# Patient Record
(demographics unavailable — no encounter records)

---

## 2025-06-03 NOTE — HISTORY OF PRESENT ILLNESS
[Obstructive Sleep Apnea] : obstructive sleep apnea [FreeTextEntry1] : Mr. Braxton 45 yo man who presents for the evaluation of HAILEY PMHx, DM, HTN, ex 5 pack year smoker quit 4 years ago Referred by Dr. Blue (PCP)  04/27/2023 : JAMES BRAXTON is a 41 year old male who is being evaluated for possible sleep disordered breathing. By his primary physician for recent findings of polycythemia. He is a former smoker who quit about 2 years ago, had smoked less than half a pack per day. No history of lung disease, asthma, dyspnea, wheezing, cough. He is told of some snoring, particularly after any alcohol ingestion, has not been told of witnessed apnea. To bed 11 PM, falls asleep within 10 to 20 minutes, awakens once briefly on a typical night before getting up at 8 AM. He generally feels refreshed in the morning. He does not nap. He does not have significant daytime sleepiness, [place] sleepiness score of 3 out of 24. Denies morning headache, parasomnia, restless legs, leg movements, cataplexy or sleep paralysis. Mild seasonal allergies, occasionally awakens with nasal or sinus congestion.  Sleep studies 5/19/23- HST- AHI 4%- 2.5/hr, AHI 3%- 11.2/hr T90 0%  6/2/2025: Presents to re-evaluate his degree of HAILEY since recently annual bloodwork obtained by his PCP demonstrated polycythemia (H/H in Feb 2025 16.9/51.6) Sleeps around 8 hours per night, feels great when he wakes up. 11:30PM - 9 AM. No snoring, gasping, or apneic episodes. No dry mouth or headaches. Does not doze or nap during the day (ESS 2). No parasomnias.  [Snoring] : no snoring [Witnessed Apneas] : no witnessed sleep apnea [Frequent Nocturnal Awakening] : no nocturnal awakening [Unintentional Sleep while Active] : no unintentional sleep while active [Unintentional Sleep While Inactive] : no unintentional sleep while inactive [Awakes Unrefreshed] : does not awake unrefreshed [Awakes with Headache] : no headache upon awakening [Awakening With Dry Mouth] : no dry mouth upon awakening [Recent  Weight Gain] : no recent weight gain [Daytime Somnolence] : no daytime somnolence [DIS] : no DIS [DMS] : no DMS [ESS] : 2

## 2025-06-03 NOTE — HISTORY OF PRESENT ILLNESS
[Obstructive Sleep Apnea] : obstructive sleep apnea [FreeTextEntry1] : Mr. Braxton 43 yo man who presents for the evaluation of HAILEY PMHx, DM, HTN, ex 5 pack year smoker quit 4 years ago Referred by Dr. Blue (PCP)  04/27/2023 : JAMES BRAXTON is a 41 year old male who is being evaluated for possible sleep disordered breathing. By his primary physician for recent findings of polycythemia. He is a former smoker who quit about 2 years ago, had smoked less than half a pack per day. No history of lung disease, asthma, dyspnea, wheezing, cough. He is told of some snoring, particularly after any alcohol ingestion, has not been told of witnessed apnea. To bed 11 PM, falls asleep within 10 to 20 minutes, awakens once briefly on a typical night before getting up at 8 AM. He generally feels refreshed in the morning. He does not nap. He does not have significant daytime sleepiness, [place] sleepiness score of 3 out of 24. Denies morning headache, parasomnia, restless legs, leg movements, cataplexy or sleep paralysis. Mild seasonal allergies, occasionally awakens with nasal or sinus congestion.  Sleep studies 5/19/23- HST- AHI 4%- 2.5/hr, AHI 3%- 11.2/hr T90 0%  6/2/2025: Presents to re-evaluate his degree of HAILEY since recently annual bloodwork obtained by his PCP demonstrated polycythemia (H/H in Feb 2025 16.9/51.6) Sleeps around 8 hours per night, feels great when he wakes up. 11:30PM - 9 AM. No snoring, gasping, or apneic episodes. No dry mouth or headaches. Does not doze or nap during the day (ESS 2). No parasomnias.  [Snoring] : no snoring [Witnessed Apneas] : no witnessed sleep apnea [Frequent Nocturnal Awakening] : no nocturnal awakening [Unintentional Sleep while Active] : no unintentional sleep while active [Unintentional Sleep While Inactive] : no unintentional sleep while inactive [Awakes Unrefreshed] : does not awake unrefreshed [Awakes with Headache] : no headache upon awakening [Awakening With Dry Mouth] : no dry mouth upon awakening [Recent  Weight Gain] : no recent weight gain [Daytime Somnolence] : no daytime somnolence [DIS] : no DIS [DMS] : no DMS [ESS] : 2

## 2025-06-03 NOTE — ASSESSMENT
[FreeTextEntry1] : REVIEWED Sleep studies 5/19/23- HST- AHI 4%- 2.5/hr, AHI 3%- 11.2/hr T90 0%  Mr. Braxton 45 yo man who presents for the evaluation of HAILEY as potential cause of secondary polycythemia. He was previously evaluated with an HST in May 2023 with a T90% of 0 and mild HAILEY noted which is unlikely to contribute to polycythemia. In addition, he has lost 13 lbs with Mounjaro and endorses restful continuous sleep with no daytime sleepiness. While it is unlikely that his HAILEY is contributing to polycythemia, he was recommended to follow up with his hematologist to elucidate an etiology for his polycythemia. Will get PSG to ensure more significant HAILEY is not missed.  Follow up after PSG is resulted.

## 2025-06-03 NOTE — CONSULT LETTER
[Dear  ___] : Dear  [unfilled], [Courtesy Letter:] : I had the pleasure of seeing your patient, [unfilled], in my office today. [Please see my note below.] : Please see my note below. [Consult Closing:] : Thank you very much for allowing me to participate in the care of this patient.  If you have any questions, please do not hesitate to contact me. [Sincerely,] : Sincerely, [FreeTextEntry3] : Vivien Campbell MD  [employer] at [facility] Pulmonary, Critical Care, and Sleep Medicine

## 2025-06-03 NOTE — PHYSICAL EXAM
[General Appearance - Well Developed] : well developed [General Appearance - Well Nourished] : well nourished [Normal Conjunctiva] : the conjunctiva exhibited no abnormalities [Normal Oropharynx] : normal oropharynx [Neck Appearance] : the appearance of the neck was normal [] : no respiratory distress [Respiration, Rhythm And Depth] : normal respiratory rhythm and effort [Bowel Sounds] : normal bowel sounds [Abdomen Soft] : soft [Nail Clubbing] : no clubbing of the fingernails [Cyanosis, Localized] : no localized cyanosis [Oriented To Time, Place, And Person] : oriented to person, place, and time [Abnormal Walk] : normal gait

## 2025-06-03 NOTE — CONSULT LETTER
[Dear  ___] : Dear  [unfilled], [Courtesy Letter:] : I had the pleasure of seeing your patient, [unfilled], in my office today. [Please see my note below.] : Please see my note below. [Consult Closing:] : Thank you very much for allowing me to participate in the care of this patient.  If you have any questions, please do not hesitate to contact me. [Sincerely,] : Sincerely, [FreeTextEntry3] : Vivien Campbell MD  [employer] at [facility] Pulmonary, Critical Care, and Sleep Medicine

## 2025-07-10 NOTE — ASSESSMENT
[FreeTextEntry1] : REVIEWED Sleep studies 5/19/23- HST- AHI 4%- 2.5/hr, AHI 3%- 11.2/hr T90 0% 6/17/20247132-XCE-[id number] 4% 2.2, AHI 4.3 3% t88 0% 4/2025- TTE with doppler without abnormality  CT heart calcium score 3/2025: visualized portion of lungs clear  Mr. Braxton 45 yo man who presents for follow-up to discuss PSG. He was referred to r/o HAILEY as potential cause of secondary polycythemia. He was previously evaluated with an HST in May 2023 with a T90% of 0 and mild HAILEY. PSG repeated after weight loss (on Mounjaro) shows no HAILEY. He has a follow-up established with a hematologist in August to elucidate an etiology for his polycythemia. Advised patient to follow-up after hematology evaluation. If workup unremarkable will need to consider PFTs, chest imaging, and tte w/ bubble.   F/u after hematology workup

## 2025-07-10 NOTE — CONSULT LETTER
[Dear  ___] : Dear  [unfilled], [Courtesy Letter:] : I had the pleasure of seeing your patient, [unfilled], in my office today. [Please see my note below.] : Please see my note below. [Consult Closing:] : Thank you very much for allowing me to participate in the care of this patient.  If you have any questions, please do not hesitate to contact me. [Sincerely,] : Sincerely, [FreeTextEntry3] : Vivien Campbell MD  [employer] at [facility] Pulmonary, Critical Care, and Sleep Medicine

## 2025-07-10 NOTE — HISTORY OF PRESENT ILLNESS
[Home] : at home, [unfilled] , at the time of the visit. [Medical Office: ([facility])___] : at the medical office located in  [Telehealth (audio & video)] : This visit was provided via telehealth using real-time 2-way audio visual technology. [Verbal consent obtained from patient] : the patient, [unfilled] [FreeTextEntry1] : Mr. Braxton 45 yo man who presents for the evaluation of HAILEY PMHx, DM, HTN, ex 5 pack year smoker quit 4 years ago Referred by Dr. Blue (PCP)  04/27/2023 : JAMES BRAXTON is a 41 year old male who is being evaluated for possible sleep disordered breathing. By his primary physician for recent findings of polycythemia. He is a former smoker who quit about 2 years ago, had smoked less than half a pack per day. No history of lung disease, asthma, dyspnea, wheezing, cough. He is told of some snoring, particularly after any alcohol ingestion, has not been told of witnessed apnea. To bed 11 PM, falls asleep within 10 to 20 minutes, awakens once briefly on a typical night before getting up at 8 AM. He generally feels refreshed in the morning. He does not nap. He does not have significant daytime sleepiness, [place] sleepiness score of 3 out of 24. Denies morning headache, parasomnia, restless legs, leg movements, cataplexy or sleep paralysis. Mild seasonal allergies, occasionally awakens with nasal or sinus congestion.  Sleep studies 5/19/23- HST- AHI 4%- 2.5/hr, AHI 3%- 11.2/hr T90 0%  6/2/2025: Presents to re-evaluate his degree of HAILEY since recently annual bloodwork obtained by his PCP demonstrated polycythemia (H/H in Feb 2025, 16.9/51.6) Sleeps around 8 hours per night, feels great when he wakes up. 11:30PM - 9 AM. No snoring, gasping, or apneic episodes. No dry mouth or headaches. Does not doze or nap during the day (ESS 2). No parasomnias.  7/10/2025 Had PSG, here to discuss results. No history of lung disease. Grandfather had lung CA but was a smoker. No pulmonary complaints. Has hx of smoking socially for about 10 years.  [ESS] : 2

## 2025-07-10 NOTE — REVIEW OF SYSTEMS
[EDS: ESS=____] : daytime somnolence: ESS=[unfilled] [Negative] : Neurologic [Recent Wt Gain (___ Lbs)] : no recent weight gain [Snoring] : no snoring [Witnessed Apneas] : no witnessed apnea